# Patient Record
Sex: MALE | Race: WHITE | NOT HISPANIC OR LATINO | Employment: OTHER | URBAN - METROPOLITAN AREA
[De-identification: names, ages, dates, MRNs, and addresses within clinical notes are randomized per-mention and may not be internally consistent; named-entity substitution may affect disease eponyms.]

---

## 2017-02-20 PROBLEM — S04.60XA: Status: ACTIVE | Noted: 2017-02-20

## 2017-03-08 ENCOUNTER — CLINICAL SUPPORT (OUTPATIENT)
Dept: AUDIOLOGY | Facility: CLINIC | Age: 47
End: 2017-03-08
Payer: COMMERCIAL

## 2017-03-08 ENCOUNTER — INITIAL CONSULT (OUTPATIENT)
Dept: OTOLARYNGOLOGY | Facility: CLINIC | Age: 47
End: 2017-03-08
Payer: COMMERCIAL

## 2017-03-08 VITALS
BODY MASS INDEX: 31.98 KG/M2 | WEIGHT: 211 LBS | SYSTOLIC BLOOD PRESSURE: 132 MMHG | HEART RATE: 87 BPM | TEMPERATURE: 99 F | DIASTOLIC BLOOD PRESSURE: 92 MMHG | HEIGHT: 68 IN

## 2017-03-08 DIAGNOSIS — Z86.69 HISTORY OF MIGRAINE: ICD-10-CM

## 2017-03-08 DIAGNOSIS — H81.313 VERTIGO, AURAL, BILATERAL: Primary | ICD-10-CM

## 2017-03-08 DIAGNOSIS — Z77.122 HISTORY OF EXPOSURE TO NOISE: ICD-10-CM

## 2017-03-08 DIAGNOSIS — H93.13 TINNITUS, BILATERAL: ICD-10-CM

## 2017-03-08 DIAGNOSIS — H90.3 HEARING LOSS, SENSORINEURAL, HIGH FREQUENCY, BILATERAL: Primary | ICD-10-CM

## 2017-03-08 DIAGNOSIS — Z87.898 HISTORY OF VERTIGO: ICD-10-CM

## 2017-03-08 DIAGNOSIS — Z82.0 FAMILY HISTORY OF MIGRAINE: ICD-10-CM

## 2017-03-08 PROCEDURE — 99999 PR PBB SHADOW E&M-EST. PATIENT-LVL III: CPT | Mod: PBBFAC,,, | Performed by: OTOLARYNGOLOGY

## 2017-03-08 PROCEDURE — 3080F DIAST BP >= 90 MM HG: CPT | Mod: S$GLB,,, | Performed by: OTOLARYNGOLOGY

## 2017-03-08 PROCEDURE — 3075F SYST BP GE 130 - 139MM HG: CPT | Mod: S$GLB,,, | Performed by: OTOLARYNGOLOGY

## 2017-03-08 PROCEDURE — 92550 TYMPANOMETRY & REFLEX THRESH: CPT | Mod: S$GLB,,, | Performed by: AUDIOLOGIST

## 2017-03-08 PROCEDURE — 99203 OFFICE O/P NEW LOW 30 MIN: CPT | Mod: S$GLB,,, | Performed by: OTOLARYNGOLOGY

## 2017-03-08 PROCEDURE — 1160F RVW MEDS BY RX/DR IN RCRD: CPT | Mod: S$GLB,,, | Performed by: OTOLARYNGOLOGY

## 2017-03-08 PROCEDURE — 92557 COMPREHENSIVE HEARING TEST: CPT | Mod: S$GLB,,, | Performed by: AUDIOLOGIST

## 2017-03-08 NOTE — MR AVS SNAPSHOT
"    [facility] - Otorhinolaryngology  [address]  [facility] [address]  Phone: [phone]  Fax: [phone]                  Jeff Bullock   3/8/2017 9:00 AM   Initial consult    Description:  Male : 1970   Provider:  Chriss Cho III, MD   Department:  [facility] - Otorhinolaryngology           Diagnoses this Visit        Comments    Hearing loss, sensorineural, high frequency, bilateral    -  Primary     History of migraine         History of vertigo         History of exposure to noise         Family history of migraine         Tinnitus, bilateral                To Do List           Goals (5 Years of Data)     None      [facility]     [facility] Care [facility] -  Assistance  Registered nurses in the [facility] Center provide clinical advisement, health education, appointment booking, and other advisory services.  Call for this free service at [phone].             Medications           Message regarding Medications     Verify the changes and/or additions to your medication regime listed below are the same as discussed with your clinician today.  If any of these changes or additions are incorrect, please notify your healthcare provider.             Verify that the below list of medications is an accurate representation of the medications you are currently taking.  If none reported, the list may be blank. If incorrect, please contact your healthcare provider. Carry this list with you in case of emergency.           Current Medications     amlodipine (NORVASC) 5 MG tablet Take 1 tablet (5 mg total) by mouth once daily.           Clinical Reference Information           Your Vitals Were     BP Pulse Temp    132/92 (BP Location: Right arm, Patient Position: Sitting, BP Method: Automatic) 87 98.6 °F (37 °C) (Tympanic)    Height Weight BMI    5' 8" (1.727 m) 95.7 kg (210 lb 15.7 oz) 32.08 kg/m2      Blood Pressure          Most Recent Value    BP  (!)  132/92    "   Allergies as of 3/8/2017     Demerol [Meperidine]      Immunizations Administered on Date of Encounter - 3/8/2017     None      [facility] Sign-Up     Activating your [facility] account is as easy as 1-2-3!     1) Visit [email or web address], select Sign Up Now, enter this activation code and your date of birth, then select Next.  [email or web address]  Expires: 4/6/2017  2:06 PM      2) Create a username and password to use when you visit [facility] in the future and select a security question in case you lose your password and select Next.    3) Enter your e-mail address and click Sign Up!    Additional Information  If you have questions, please e-mail [email or web address] or call [phone] to talk to our [facility] staff. Remember, [facility] is NOT to be used for urgent needs. For medical emergencies, dial 911.         Instructions    Audiometry reviewed: high frequency SNHL  Hearing protection  Migraine diet sheet provided  Consider neurology consultation pending course [id number]  Complete VNG testing to be scheduled; vertigo work-up literature provided  Old records may be helpful( Navneet)                  Language Assistance Services     ATTENTION: Language assistance services are available, free of charge. Please call [phone].      ATENCIÓN: Si habla español, tiene a gustafson disposición servicios gratuitos de asistencia lingüística. Llame al [phone].     CHÚ Ý: N?u b?n nói Ti?ng Vi?t, có các d?ch v? h? tr? ngôn ng? mi?n phí dành cho b?n. G?i s? [phone].         [facility] complies with applicable Federal civil rights laws and does not discriminate on the basis of race, color, national origin, age, disability, or sex.

## 2017-03-08 NOTE — PROGRESS NOTES
Normal sloping to moderate rising to normal sensorineural hearing loss with type A tympanograms and present reflexes bilaterally.

## 2017-03-08 NOTE — PROGRESS NOTES
Subjective:       Patient ID: Jeff Bullock is a 46 y.o. male.    Chief Complaint: No chief complaint on file.    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    He remembers vestibular rehabilitation therapy scheduled and performed after his workup.He was diagnosed with some form of left ear pathology.  I believe the patient would like some clarification about his in her ear dizziness and vertigo symptoms status.  He is presently at .  He works locally and regionally.  He is not plagued by dizziness or vertigo symptoms while driving at this point.  He indicates dizziness symptoms of 1 year duration.    He is status post a MRI brain scan performed in April 2016 which was an open study without dye performed in [place].  He has a history of migraine headaches which exacerbated by stress.  He had problems in [place] when he had to leave a good job there due to a corporate shake up.  He has a large family.  His life is much more stable now.  He was examined by Dr. Jeffries 2/20/17 with dizziness symptoms, noting his history of vertigo.    His medical problem list includes a pack disorder, mood disorder, migraine, GERD, essential hypertension, chronic tension-type headaches and vestibular nerve damage?    His medication list had included Xanax and Prozac and 25 Antivert.    PMH: High blood pressure, migraine headaches, vertigo  Family history: Migraine headaches, heart disease, high blood pressure  ALLERGIES: Demerol  Review of Systems   Ears: Positive for ear pressure, ringing in ear and dizziness.    Nose:  Positive for postnasal drip and snoring.    Cardiovascular:  Positive for history of high blood pressure.   Gastrointestinal:  Positive for acid reflux.   Other:  Positive for depression and anxiety. Negative for rash.         The patient has completed an audiometric study performed  by the [facility] audiology service.  The study is duplicated below the results reviewed with the patient  Objective:           Blood pressure 132/92 pulse 87 temperature 98.6 height 5 feet 8 inches weight 210 pounds.  Gen.: Alert and oriented and articulate gentleman in no acute distress today  Physical Exam   Constitutional: He is oriented to person, place, and time. He appears well-developed and well-nourished.   HENT:   Head: Normocephalic.   Right Ear: Hearing, tympanic membrane and ear canal normal. No drainage. No foreign bodies. No mastoid tenderness. Tympanic membrane is not perforated. No decreased hearing is noted.   Left Ear: Hearing, tympanic membrane and ear canal normal. No drainage. No foreign bodies. No mastoid tenderness. Tympanic membrane is not perforated. No decreased hearing is noted.   Ears:    Nose: No nose lacerations, nasal deformity, septal deviation or nasal septal hematoma. No epistaxis. Right sinus exhibits no maxillary sinus tenderness and no frontal sinus tenderness. Left sinus exhibits no maxillary sinus tenderness and no frontal sinus tenderness.   Mouth/Throat: Uvula is midline, oropharynx is clear and moist and mucous membranes are normal. He does not have dentures. No oral lesions. No trismus in the jaw. No uvula swelling or dental caries. No oropharyngeal exudate or tonsillar abscesses.   Neck: No thyromegaly present.   Pulmonary/Chest: Effort normal. No stridor.   Lymphadenopathy:     He has no cervical adenopathy.   Neurological: He is alert and oriented to person, place, and time.   Skin: No rash noted.   Psychiatric: His behavior is normal.       Assessment:       1. Hearing loss, sensorineural, high frequency, bilateral    2. History of migraine    3. History of vertigo    4. History of exposure to noise    5. Family history of migraine    6. Tinnitus, bilateral        Plan:     Audiometry reviewed: high frequency SNHL  Hearing protection  Migraine diet sheet  provided  Consider neurology consultation pending course [id number]  Complete VNG testing to be scheduled; vertigo work-up literature provided  Old records may be helpful( Navneet)

## 2017-03-08 NOTE — PATIENT INSTRUCTIONS
Audiometry reviewed: high frequency SNHL  Hearing protection  Migraine diet sheet provided  Consider neurology consultation pending course [id number]  Complete VNG testing to be scheduled; vertigo work-up literature provided  Old records may be helpful( Navneet)

## 2017-03-30 ENCOUNTER — OFFICE VISIT (OUTPATIENT)
Dept: OTOLARYNGOLOGY | Facility: CLINIC | Age: 47
End: 2017-03-30
Payer: COMMERCIAL

## 2017-03-30 ENCOUNTER — CLINICAL SUPPORT (OUTPATIENT)
Dept: AUDIOLOGY | Facility: CLINIC | Age: 47
End: 2017-03-30
Payer: COMMERCIAL

## 2017-03-30 VITALS — HEART RATE: 83 BPM | TEMPERATURE: 98 F | DIASTOLIC BLOOD PRESSURE: 86 MMHG | SYSTOLIC BLOOD PRESSURE: 129 MMHG

## 2017-03-30 DIAGNOSIS — Z86.69 HISTORY OF MIGRAINE: ICD-10-CM

## 2017-03-30 DIAGNOSIS — R42 LIGHTHEADEDNESS: Primary | ICD-10-CM

## 2017-03-30 DIAGNOSIS — H90.3 HEARING LOSS, SENSORINEURAL, HIGH FREQUENCY, BILATERAL: ICD-10-CM

## 2017-03-30 DIAGNOSIS — R42 DIZZINESS: Primary | ICD-10-CM

## 2017-03-30 DIAGNOSIS — F41.0 PANIC DISORDER: ICD-10-CM

## 2017-03-30 PROCEDURE — 92537 CALORIC VSTBLR TEST W/REC: CPT | Mod: S$GLB,,, | Performed by: AUDIOLOGIST-HEARING AID FITTER

## 2017-03-30 PROCEDURE — 99999 PR PBB SHADOW E&M-EST. PATIENT-LVL III: CPT | Mod: PBBFAC,,, | Performed by: OTOLARYNGOLOGY

## 2017-03-30 PROCEDURE — 92540 BASIC VESTIBULAR EVALUATION: CPT | Mod: S$GLB,,, | Performed by: AUDIOLOGIST-HEARING AID FITTER

## 2017-03-30 PROCEDURE — 99212 OFFICE O/P EST SF 10 MIN: CPT | Mod: S$GLB,,, | Performed by: OTOLARYNGOLOGY

## 2017-03-30 PROCEDURE — 1160F RVW MEDS BY RX/DR IN RCRD: CPT | Mod: S$GLB,,, | Performed by: OTOLARYNGOLOGY

## 2017-03-30 PROCEDURE — 3074F SYST BP LT 130 MM HG: CPT | Mod: S$GLB,,, | Performed by: OTOLARYNGOLOGY

## 2017-03-30 PROCEDURE — 3079F DIAST BP 80-89 MM HG: CPT | Mod: S$GLB,,, | Performed by: OTOLARYNGOLOGY

## 2017-03-30 RX ORDER — ALPRAZOLAM 0.5 MG/1
TABLET ORAL
Refills: 3 | COMMUNITY
Start: 2017-03-09 | End: 2017-04-18 | Stop reason: SDUPTHER

## 2017-03-30 NOTE — PROGRESS NOTES
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  He has a history of migraine.  His chief complaint is a lightheadedness sensation or a "hangover" feeling.  He is a .  He indicates dizziness symptoms which can escalate and cause panic attacks at times.    He admits that he is  getting better at controlling them in the wake of his many medical visits here however.    He is more comfortable in general with his workup and diagnoses and prognosis now.    V NG test results are reviewed with him:  There was no evidence of gaze nystagmus with spontaneous nystagmus recorded during the study today.  Ocular motor function was normal as was fixation suppression.  Both head hanging left and head hanging right Hallpike maneuvers were negative.  Static positional testing revealed a clinically insignificant nystagmus in 1 head position i.e. 2°/s left beating in head left position.  Caloric testing indicated a 19% right caloric weakness with all cords measuring within 1° of each other with exception of the left warm caloric.    Impression: Normal VNG; no evidence of vestibular system dysfunction including BPPV.      PE:/86 P 83 T 97.8  Otologic exam unchanged from previous status.      DIAGNOSIS:     ICD-10-CM ICD-9-CM    1. Lightheadedness R42 780.4    2. History of migraine Z86.69 V12.49    3. Panic disorder F41.0 300.01    4. Hearing loss, sensorineural, high frequency, bilateral H91.93 389.8      PLAN: VNG results reviewed with patient  Cawthorne head exercises may help; perform BID x several weeks; instruction sheet provided  Call with status report 2-3 weeks  Consider neurological consulattion pending course re: migraine/panic attack management  Hearing protection reiterated prn      "

## 2017-03-30 NOTE — PATIENT INSTRUCTIONS
VNG results reviewed with patient  Cawthorne head exercises may help; perform BID x several weeks  Call with status report 2-3 weeks  Consider neurological consulattion pending course re: migraine/panic attack management  Hearing protection reiterated prn

## 2017-03-30 NOTE — PROGRESS NOTES
VNG/Postuography Evaluation    Referring physician:  Dr. Cho    46 y.o. male complains of lightheadedness, nausea and occasional headache.  Symptoms are provoked by quick head turns, bending over and looking up and have been recurring over the past year. Mr. Bullock reported that his episodes are pretty brief-usually lasting only a few minutes. He stated his symptoms have gotten better over the past couple of weeks. Mr. Bullock did admit that he had increase stress and several migraines about a year ago when all of this started to occur.    Gaze nystagmus was absent.    Oculomotor function was normal.    Spontaneous nystagmus was absent.    The head-hanging left Hallpike was negative.    The head-hanging right Hallpike was negative.    Static positional testing revealed clinically insignificant nystagmus in one head position: 2 d/s left-beating in head left.    Unilateral weakness: 19% (right)  Directional preponderance 16% (left beating)    RC: 12 d/s   d/s  RW: 11 d/s  LW: 23 d/s    Fixation suppression was normal.    Impression: Normal VNG; no evidence of vestibular system dysfunction including BPPV. The presence of clinically insignificant positional nystagmus suggests a non-localizing vestibular abnormality that is incompletely compensated.    Recommendation: Trial period with Cawthorne exercises. Mr. Bullock was given these a copy to try at home.

## 2017-06-01 ENCOUNTER — OFFICE VISIT (OUTPATIENT)
Dept: NEUROLOGY | Facility: CLINIC | Age: 47
End: 2017-06-01
Payer: COMMERCIAL

## 2017-06-01 VITALS
HEART RATE: 89 BPM | SYSTOLIC BLOOD PRESSURE: 143 MMHG | BODY MASS INDEX: 32.68 KG/M2 | HEIGHT: 68 IN | DIASTOLIC BLOOD PRESSURE: 91 MMHG | WEIGHT: 215.63 LBS

## 2017-06-01 DIAGNOSIS — G43.909 MIGRAINE WITHOUT STATUS MIGRAINOSUS, NOT INTRACTABLE, UNSPECIFIED MIGRAINE TYPE: ICD-10-CM

## 2017-06-01 DIAGNOSIS — R42 DIZZINESS: Primary | ICD-10-CM

## 2017-06-01 DIAGNOSIS — M54.2 CERVICALGIA: ICD-10-CM

## 2017-06-01 DIAGNOSIS — Z72.0 TOBACCO USE: ICD-10-CM

## 2017-06-01 DIAGNOSIS — I10 ESSENTIAL HYPERTENSION: ICD-10-CM

## 2017-06-01 PROCEDURE — 99999 PR PBB SHADOW E&M-EST. PATIENT-LVL III: CPT | Mod: PBBFAC,,,

## 2017-06-01 PROCEDURE — 99205 OFFICE O/P NEW HI 60 MIN: CPT | Mod: S$GLB,,,

## 2017-06-01 RX ORDER — OMEPRAZOLE 20 MG/1
20 TABLET, DELAYED RELEASE ORAL DAILY
COMMUNITY
End: 2021-09-16 | Stop reason: ALTCHOICE

## 2017-06-01 RX ORDER — PROPRANOLOL HYDROCHLORIDE 60 MG/1
60 CAPSULE, EXTENDED RELEASE ORAL DAILY
Qty: 30 CAPSULE | Refills: 1 | Status: SHIPPED | OUTPATIENT
Start: 2017-06-01 | End: 2017-06-05 | Stop reason: SINTOL

## 2017-06-01 NOTE — LETTER
June 1, 2017      Mich Jeffries MD  [address] [address] [address]           [facility] Neurology  [address]  [place] [address]  Phone: [phone]  Fax: [phone]          Patient: Jeff Bullock   MR Number: [id number]   YOB: 1970   Date of Visit: 6/1/2017       Dear Dr. Mich Jeffries:    Thank you for referring Jeff Bullock to me for evaluation. Attached you will find relevant portions of my assessment and plan of care.    If you have questions, please do not hesitate to call me. I look forward to following Jeff Bullock along with you.    Sincerely,    Sb Gomez III, MD    Enclosure  CC:  No Recipients    If you would like to receive this communication electronically, please contact [email or web address] or [phone] to request more information on [employer] Link access.    For providers and/or their staff who would like to refer a patient to [facility], please contact us through our one-stop-shop provider referral line, [facility], at [phone].    If you feel you have received this communication in error or would no longer like to receive these types of communications, please e-mail [email or web address]

## 2017-06-01 NOTE — PATIENT INSTRUCTIONS
I discussed the risks, benefits and side effects of medication.  If any problem with the medication instructed to discontinue it and notify me immediately.  I will forward your results with any additional recommendations.   Follow up with your PCP.

## 2017-06-01 NOTE — PROGRESS NOTES
This office note has been dictated.  June 1, 2017      Mich Jeffries M.D.  [address]  [address]    RE:  JEFF HARRIS  [facility]  [id number]    Dear Dr. Jeffries:    I saw your patient, Jeff Harris in my office on June 1, 2017.  This 46-year-old   right-handed man presents to Neurology Clinic with a chief complaint of   dizziness.  This has been present for more than a year now.  He cannot relate   the onset to any particular illness or injury.  He describes a foggy sensation   in his head associated with ear fullness.  He also notes neck stiffness or pain   and has a history of ocular migraines as well.  This has been evaluated with   brain imaging and more recently an ENT evaluation with Dr. Cho.  He was   found to have high frequency hearing loss bilaterally, but vestibular   nystagmography was negative.  When he is stressed out, his neck discomfort   increases, he feels fatigued, and this seems to make the dizziness worse.  He   gets a little bit of nausea, but there is no vomiting.  Extending his neck as if   looking up also makes his symptomatology worse.  He notes some sensitivity   to bright lights.  He has been tried on several medications in the past for   panic episodes and depression including Klonopin, Effexor and Lexapro, which   have been either poorly tolerated or ineffective.    NEUROLOGICAL REVIEW OF SYSTEMS:  He has noted occasional sweating.  The   migraines are predominantly ocular with only a very mild headache component and   these actually are occurring fairly infrequently.  He does admit to being an   anxious individual.  He also takes Xanax to sleep at night.  He denies fever,   chills, chest pain, shortness of breath, syncope, back or leg pain,   paresthesias, weakness in the extremities, ataxia, tremor, diplopia, visual   loss, speech or swallowing difficulty, bowel or bladder incontinence.    PAST MEDICAL HISTORY:  Reviewed with the patient, edited by me  in the electronic   record and is included in this note.    PHYSICAL EXAMINATION:  GENERAL:  This is an alert, oriented and attentive man.  His speech is   appropriate and adequately articulated.  VITAL SIGNS:  Reviewed.  The blood pressure is mildly elevated and the values   are included in this note.  I am recommending he follow this up with you.  He is dressed in his work attire and in no acute distress.  HEENT:  Cranial nerve examination reveals his acuity to be 20/20 to the near   card with correction.  Visual fields are full.  Pupils are equal and reactive to   light and the extraocular movements are conjugate.  The optic disks are sharp   bilaterally.  There are no sensorimotor deficits on the face and his tongue and   palate are in the midline.  He hears equally in the two ears to the tuning fork.  NECK:  Supple, the pulses equal and no bruits are heard.  NEUROLOGIC:  On motor examination, he has excellent symmetrical strength with   normal muscle bulk and tone in all extremities.    On sensory examination, all modalities are intact and he has palpable peripheral pulses.    On cerebellar testing, finger-to-nose, fine motor and rapid alternating movements are   adequately and equally performed.  He can rise from the chair without pushing up   with his hands and walks well on a narrow base toes and heels.    Romberg sign is not present.    The deep tendon reflexes are normoactive and symmetrical and his   plantar responses are flexor bilaterally.    I reviewed his clinic record including his recent laboratory on the computer   console.  I discussed the situation with him in detail.    In conclusion, this is a man with a complaint of dizziness who has documented   high frequency hearing loss, but an otherwise benign neurological examination.    He has associated symptomatology including ocular migraine as well as some neck   discomfort and exacerbation of his dizziness with hyperextension.  I am going to    request a CT angiography of the head and neck to assess for degenerative   arthritis, which might be causing vascular compromise in the posterior   circulation.  I am going to also prescribe Inderal-LA 60 one a day as a migraine   preventative, which should also help with some of his stress and anxiety, as   well as his blood pressure.  I am also recommending he start an 81 mg aspirin   tablet a day for vascular prophylaxis and a daily multivitamin.  I discussed   potential risks and benefits including side effects of the medication and asked   that he notify me immediately and discontinue it if he has any problem taking   it.  I will be forwarding the results of the CTA to him with any additional   Recommendations as indicated, and plan to see him back in clinic in two   months to review the situation with him again.  I am otherwise returning him to   your care for followup.    In the meantime, it has been a pleasure to see this man in consultation.  If I   could be of help to you in the future, please let me know.    Sincerely,      Sb Gomez III, M.D.                  BOB  dd: 06/01/2017 15:20:35 (CDT)  td: 06/01/2017 15:51:25 (CDT)  Doc ID   #[id number]  Job ID #[id number]    CC:

## 2017-06-05 ENCOUNTER — TELEPHONE (OUTPATIENT)
Dept: NEUROLOGY | Facility: CLINIC | Age: 47
End: 2017-06-05

## 2017-06-05 DIAGNOSIS — R42 DIZZINESS: Primary | ICD-10-CM

## 2017-06-05 DIAGNOSIS — M54.2 CERVICALGIA: ICD-10-CM

## 2017-06-05 DIAGNOSIS — G43.909 MIGRAINE WITHOUT STATUS MIGRAINOSUS, NOT INTRACTABLE, UNSPECIFIED MIGRAINE TYPE: ICD-10-CM

## 2017-06-05 DIAGNOSIS — I10 ESSENTIAL HYPERTENSION: ICD-10-CM

## 2017-06-05 DIAGNOSIS — Z72.0 TOBACCO USE: ICD-10-CM

## 2017-06-05 RX ORDER — PROPRANOLOL HYDROCHLORIDE 10 MG/1
10 TABLET ORAL EVERY 6 HOURS PRN
Qty: 30 TABLET | Refills: 2 | Status: SHIPPED | OUTPATIENT
Start: 2017-06-05 | End: 2017-08-08

## 2017-06-05 NOTE — TELEPHONE ENCOUNTER
Discussed with the patient.  Has felt dizzy and nauseous, trouble sleeping with Inderal LA 60.  Will d/c and try 10 mg Inderal Q 6-8 hrs prn.  [facility]

## 2017-06-08 ENCOUNTER — HOSPITAL ENCOUNTER (OUTPATIENT)
Dept: RADIOLOGY | Facility: HOSPITAL | Age: 47
Discharge: HOME | End: 2017-06-08
Payer: COMMERCIAL

## 2017-06-08 DIAGNOSIS — G43.909 MIGRAINE WITHOUT STATUS MIGRAINOSUS, NOT INTRACTABLE, UNSPECIFIED MIGRAINE TYPE: ICD-10-CM

## 2017-06-08 DIAGNOSIS — M54.2 CERVICALGIA: ICD-10-CM

## 2017-06-08 DIAGNOSIS — R42 DIZZINESS: ICD-10-CM

## 2017-06-08 DIAGNOSIS — I10 ESSENTIAL HYPERTENSION: ICD-10-CM

## 2017-06-08 DIAGNOSIS — Z72.0 TOBACCO USE: ICD-10-CM

## 2017-06-08 PROCEDURE — 70498 CT ANGIOGRAPHY NECK: CPT | Mod: 26,,, | Performed by: RADIOLOGY

## 2017-06-08 PROCEDURE — 70496 CT ANGIOGRAPHY HEAD: CPT | Mod: TC

## 2017-06-08 PROCEDURE — 70496 CT ANGIOGRAPHY HEAD: CPT | Mod: 26,,, | Performed by: RADIOLOGY

## 2017-06-08 PROCEDURE — 25500020 PHARM REV CODE 255

## 2017-06-08 RX ADMIN — IOHEXOL 100 ML: 350 INJECTION, SOLUTION INTRAVENOUS at 04:06

## 2017-06-09 ENCOUNTER — TELEPHONE (OUTPATIENT)
Dept: NEUROLOGY | Facility: CLINIC | Age: 47
End: 2017-06-09

## 2017-06-09 LAB
CREAT SERPL-MCNC: 1 MG/DL (ref 0.5–1.4)
SAMPLE: NORMAL

## 2017-06-09 NOTE — TELEPHONE ENCOUNTER
----- Message from Marija Marshall sent at 6/9/2017 12:06 PM CDT -----  Contact: pt [phone]  Pt is calling for his test results.pls call

## 2017-08-01 ENCOUNTER — TELEPHONE (OUTPATIENT)
Dept: NEUROLOGY | Facility: CLINIC | Age: 47
End: 2017-08-01

## 2017-08-01 NOTE — TELEPHONE ENCOUNTER
Patient mobile number not working and home number  was unable to leave a message due to mailbox being full. I will mail an appointment slip to the patient home.

## 2017-08-08 PROBLEM — S04.60XA: Status: RESOLVED | Noted: 2017-02-20 | Resolved: 2017-08-08

## 2017-08-08 PROBLEM — R42 DIZZINESS: Status: RESOLVED | Noted: 2017-06-01 | Resolved: 2017-08-08

## 2019-06-18 ENCOUNTER — OFFICE VISIT (OUTPATIENT)
Dept: GASTROENTEROLOGY | Facility: CLINIC | Age: 49
End: 2019-06-18
Payer: COMMERCIAL

## 2019-06-18 VITALS
BODY MASS INDEX: 30.1 KG/M2 | SYSTOLIC BLOOD PRESSURE: 127 MMHG | WEIGHT: 198 LBS | HEART RATE: 80 BPM | DIASTOLIC BLOOD PRESSURE: 80 MMHG

## 2019-06-18 DIAGNOSIS — R09.A2 GLOBUS SENSATION: ICD-10-CM

## 2019-06-18 DIAGNOSIS — F41.9 ANXIETY: ICD-10-CM

## 2019-06-18 DIAGNOSIS — R13.10 DYSPHAGIA, UNSPECIFIED TYPE: ICD-10-CM

## 2019-06-18 DIAGNOSIS — K21.9 GASTROESOPHAGEAL REFLUX DISEASE, ESOPHAGITIS PRESENCE NOT SPECIFIED: Primary | ICD-10-CM

## 2019-06-18 PROCEDURE — 99244 OFF/OP CNSLTJ NEW/EST MOD 40: CPT | Mod: S$GLB,,, | Performed by: INTERNAL MEDICINE

## 2019-06-18 PROCEDURE — 99999 PR PBB SHADOW E&M-EST. PATIENT-LVL III: CPT | Mod: PBBFAC,,, | Performed by: INTERNAL MEDICINE

## 2019-06-18 PROCEDURE — 99999 PR PBB SHADOW E&M-EST. PATIENT-LVL III: ICD-10-PCS | Mod: PBBFAC,,, | Performed by: INTERNAL MEDICINE

## 2019-06-18 PROCEDURE — 99244 PR OFFICE CONSULTATION,LEVEL IV: ICD-10-PCS | Mod: S$GLB,,, | Performed by: INTERNAL MEDICINE

## 2019-06-18 NOTE — LETTER
June 18, 2019      Mich Jeffries MD  [address] [address] [address]           [facility] Gastroenterology  [address]  [facility] [address]  Phone: [phone]          Patient: Jeff Bullock   MR Number: [id number]   YOB: 1970   Date of Visit: 6/18/2019       Dear Dr. Mich Jeffries:    Thank you for referring Jeff Bullock to me for evaluation. Attached you will find relevant portions of my assessment and plan of care.    If you have questions, please do not hesitate to call me. I look forward to following Jeff Bullock along with you.    Sincerely,    Pradeep Wheatley MD    Enclosure  CC:  No Recipients    If you would like to receive this communication electronically, please contact [email or web address] or [phone] to request more information on [employer] Link access.    For providers and/or their staff who would like to refer a patient to [facility], please contact us through our one-stop-shop provider referral line, [facility], at [phone].    If you feel you have received this communication in error or would no longer like to receive these types of communications, please e-mail [email or web address]

## 2019-06-18 NOTE — H&P (VIEW-ONLY)
Subjective:       Patient ID: Jeff Bullock is a 48 y.o. male.    This patient is new to me.  Referring provider:  Dr. Jeffries for GERD.      Chief Complaint: GERD    Gastroesophageal Reflux   He complains of abdominal pain (epigastric), dysphagia (intermittent), globus sensation, heartburn and water brash. He reports no chest pain, no coughing, no nausea or no wheezing. This is a new problem. The current episode started more than 1 year ago. The problem occurs frequently. The problem has been gradually worsening. The heartburn duration is several minutes. The heartburn is of moderate intensity. The heartburn wakes him from sleep. The heartburn does not limit his activity. The heartburn changes with position. The symptoms are aggravated by certain foods. Pertinent negatives include no anemia, fatigue, melena or weight loss. Risk factors include caffeine use. He has tried a PPI for the symptoms. The treatment provided mild relief. Past procedures include an EGD (Last one was many years ago with Dr. Car). He has never had screening colonoscopy.     Review of Systems   Constitutional: Negative for chills, fatigue, fever and weight loss.   HENT: Negative for trouble swallowing.    Respiratory: Negative for cough, shortness of breath and wheezing.    Cardiovascular: Negative for chest pain and palpitations.   Gastrointestinal: Positive for abdominal pain (epigastric), dysphagia (intermittent) and heartburn. Negative for constipation, diarrhea, melena, nausea and vomiting.   Musculoskeletal: Negative for arthralgias and myalgias.   Skin: Negative for color change and rash.   Neurological: Negative for dizziness, weakness and numbness.   Psychiatric/Behavioral: Negative for confusion. The patient is not nervous/anxious.    All other systems reviewed and are negative.      Objective:       Vitals:    06/18/19 1441   BP: 127/80   Pulse: 80   Weight: 89.8 kg (197 lb 15.6 oz)       Physical Exam   Constitutional: He is  oriented to person, place, and time. He appears well-developed and well-nourished.   HENT:   Head: Normocephalic and atraumatic.   Mouth/Throat: Oropharynx is clear and moist.   Eyes: Pupils are equal, round, and reactive to light. Conjunctivae are normal. No scleral icterus.   Neck: Normal range of motion. Neck supple. No thyromegaly present.   Cardiovascular: Normal rate and regular rhythm.   No murmur heard.  Pulmonary/Chest: Effort normal and breath sounds normal. He has no wheezes.   Abdominal: Soft. Bowel sounds are normal. He exhibits no distension. There is no tenderness.   Musculoskeletal: He exhibits no edema.   Lymphadenopathy:     He has no cervical adenopathy.   Neurological: He is alert and oriented to person, place, and time.   Skin: Skin is warm and dry. No rash noted. No erythema.   Psychiatric: He has a normal mood and affect. His behavior is normal.   Vitals reviewed.        Lab Results   Component Value Date    WBC 5.90 05/28/2019    HGB 17.1 05/28/2019    HCT 49.4 05/28/2019    MCV 95 05/28/2019     05/28/2019       CMP  Sodium   Date Value Ref Range Status   05/28/2019 143 136 - 145 mmol/L Final     Potassium   Date Value Ref Range Status   05/28/2019 3.8 3.5 - 5.1 mmol/L Final     Chloride   Date Value Ref Range Status   05/28/2019 102 95 - 110 mmol/L Final     CO2   Date Value Ref Range Status   05/28/2019 31 22 - 31 mmol/L Final     Glucose   Date Value Ref Range Status   05/28/2019 130 (H) 70 - 110 mg/dL Final     Comment:     The ADA recommends the following guidelines for fasting glucose:  Normal:       less than 100 mg/dL  Prediabetes:  100 mg/dL to 125 mg/dL  Diabetes:     126 mg/dL or higher       BUN, Bld   Date Value Ref Range Status   05/28/2019 17 9 - 21 mg/dL Final     Creatinine   Date Value Ref Range Status   05/28/2019 0.90 0.50 - 1.40 mg/dL Final     Calcium   Date Value Ref Range Status   05/28/2019 9.3 8.4 - 10.2 mg/dL Final     Total Protein   Date Value Ref Range  Status   05/28/2019 8.2 6.0 - 8.4 g/dL Final     Albumin   Date Value Ref Range Status   05/28/2019 4.9 3.5 - 5.2 g/dL Final     Total Bilirubin   Date Value Ref Range Status   05/28/2019 0.4 0.2 - 1.3 mg/dL Final     Alkaline Phosphatase   Date Value Ref Range Status   05/28/2019 62 38 - 145 U/L Final     AST   Date Value Ref Range Status   05/28/2019 33 17 - 59 U/L Final     ALT   Date Value Ref Range Status   05/28/2019 33 10 - 44 U/L Final     Anion Gap   Date Value Ref Range Status   05/28/2019 10 8 - 16 mmol/L Final     eGFR if    Date Value Ref Range Status   05/28/2019 >60 >60 mL/min/1.73 m^2 Final     eGFR if non    Date Value Ref Range Status   05/28/2019 >60 >60 mL/min/1.73 m^2 Final     Comment:     Calculation used to obtain the estimated glomerular filtration  rate (eGFR) is the CKD-EPI equation.          CXR was independently visualized and reviewed by me and showed no acute process.    Further history was obtained from the patient's wife who was present throughout the interview and states that he has had longstanding GERD.  History is otherwise as above in the HPI.     Assessment:       1. Gastroesophageal reflux disease, esophagitis presence not specified    2. Dysphagia, unspecified type    3. Globus sensation    4. Anxiety        Plan:       1.  Antireflux precautions including avoidance of late night eating and lying down soon after eating.     2.  Continue PPI  3.  Proven risks of long term PPI use, including pneumonia, c.diff infection, and bone loss, as well as theoretical risks including dementia and CKD, were discussed with the patient at length and the patient understands risks and benefits of therapy.  Option of tapering/weaning PPI away was also discussed, including the need for possible long term therapy to treat symptoms if they recur after cessation of medication, as well as to mitigate the risk of developing complications of reflux such as Guido's  esophagus and/or esophageal cancer.  Patient understands the risks and benefits of treatment with drug versus cessation.  Daily supplementation with MVI with calcium and vitamin D were recommended as was follow up with PCP for bone scan when appropriate.  Labs including B12, folate, CMP, CBC, calcium, and magnesium should be checked at least annually.  4.  Schedule EGD  5.  Colonoscopy for screening  6.  Avoid NSAIDs  7.  Further recommendations to follow after above.  8.  Communication will be sent to the referring provider regarding my assessment and plan on this patient via EPIC.

## 2019-06-24 ENCOUNTER — ANESTHESIA (OUTPATIENT)
Dept: ENDOSCOPY | Facility: HOSPITAL | Age: 49
End: 2019-06-24
Payer: COMMERCIAL

## 2019-06-24 ENCOUNTER — ANESTHESIA EVENT (OUTPATIENT)
Dept: ENDOSCOPY | Facility: HOSPITAL | Age: 49
End: 2019-06-24
Payer: COMMERCIAL

## 2019-06-24 ENCOUNTER — HOSPITAL ENCOUNTER (OUTPATIENT)
Facility: HOSPITAL | Age: 49
Discharge: HOME | End: 2019-06-24
Attending: INTERNAL MEDICINE | Admitting: INTERNAL MEDICINE
Payer: COMMERCIAL

## 2019-06-24 DIAGNOSIS — K44.9 HIATAL HERNIA: ICD-10-CM

## 2019-06-24 DIAGNOSIS — K29.70 GASTRITIS, PRESENCE OF BLEEDING UNSPECIFIED, UNSPECIFIED CHRONICITY, UNSPECIFIED GASTRITIS TYPE: Primary | ICD-10-CM

## 2019-06-24 DIAGNOSIS — K31.7 GASTRIC POLYPS: ICD-10-CM

## 2019-06-24 DIAGNOSIS — K21.9 GASTROESOPHAGEAL REFLUX: ICD-10-CM

## 2019-06-24 PROCEDURE — 25000003 PHARM REV CODE 250: Performed by: INTERNAL MEDICINE

## 2019-06-24 PROCEDURE — 63600175 PHARM REV CODE 636 W HCPCS: Performed by: NURSE ANESTHETIST, CERTIFIED REGISTERED

## 2019-06-24 PROCEDURE — 43239 EGD BIOPSY SINGLE/MULTIPLE: CPT | Performed by: INTERNAL MEDICINE

## 2019-06-24 PROCEDURE — 37000008 HC ANESTHESIA 1ST 15 MINUTES: Performed by: INTERNAL MEDICINE

## 2019-06-24 PROCEDURE — 88305 TISSUE EXAM BY PATHOLOGIST: CPT | Performed by: PATHOLOGY

## 2019-06-24 PROCEDURE — 43251 EGD REMOVE LESION SNARE: CPT | Mod: ,,, | Performed by: INTERNAL MEDICINE

## 2019-06-24 PROCEDURE — 43239 EGD BIOPSY SINGLE/MULTIPLE: CPT | Mod: 59,,, | Performed by: INTERNAL MEDICINE

## 2019-06-24 PROCEDURE — 88305 TISSUE EXAM BY PATHOLOGIST: CPT | Mod: 26,,, | Performed by: PATHOLOGY

## 2019-06-24 PROCEDURE — D9220A PRA ANESTHESIA: Mod: CRNA,,, | Performed by: NURSE ANESTHETIST, CERTIFIED REGISTERED

## 2019-06-24 PROCEDURE — 43251 EGD REMOVE LESION SNARE: CPT | Performed by: INTERNAL MEDICINE

## 2019-06-24 PROCEDURE — 43251 PR EGD, FLEX, W/REMOVAL, TUMOR/POLYP/LESION(S), SNARE: ICD-10-PCS | Mod: ,,, | Performed by: INTERNAL MEDICINE

## 2019-06-24 PROCEDURE — 88342 IMHCHEM/IMCYTCHM 1ST ANTB: CPT | Mod: 26,,, | Performed by: PATHOLOGY

## 2019-06-24 PROCEDURE — 27201012 HC FORCEPS, HOT/COLD, DISP: Performed by: INTERNAL MEDICINE

## 2019-06-24 PROCEDURE — 88342 TISSUE SPECIMEN TO PATHOLOGY - SURGERY: ICD-10-PCS | Mod: 26,,, | Performed by: PATHOLOGY

## 2019-06-24 PROCEDURE — 88305 TISSUE SPECIMEN TO PATHOLOGY - SURGERY: ICD-10-PCS | Mod: 26,,, | Performed by: PATHOLOGY

## 2019-06-24 PROCEDURE — 88342 IMHCHEM/IMCYTCHM 1ST ANTB: CPT | Performed by: PATHOLOGY

## 2019-06-24 PROCEDURE — 27201089 HC SNARE, DISP (ANY): Performed by: INTERNAL MEDICINE

## 2019-06-24 PROCEDURE — D9220A PRA ANESTHESIA: ICD-10-PCS | Mod: ANES,,, | Performed by: ANESTHESIOLOGY

## 2019-06-24 PROCEDURE — 43239 PR EGD, FLEX, W/BIOPSY, SGL/MULTI: ICD-10-PCS | Mod: 59,,, | Performed by: INTERNAL MEDICINE

## 2019-06-24 PROCEDURE — D9220A PRA ANESTHESIA: Mod: ANES,,, | Performed by: ANESTHESIOLOGY

## 2019-06-24 PROCEDURE — D9220A PRA ANESTHESIA: ICD-10-PCS | Mod: CRNA,,, | Performed by: NURSE ANESTHETIST, CERTIFIED REGISTERED

## 2019-06-24 RX ORDER — PROPOFOL 10 MG/ML
VIAL (ML) INTRAVENOUS
Status: DISCONTINUED | OUTPATIENT
Start: 2019-06-24 | End: 2019-06-24

## 2019-06-24 RX ORDER — SODIUM CHLORIDE 9 MG/ML
INJECTION, SOLUTION INTRAVENOUS CONTINUOUS
Status: DISCONTINUED | OUTPATIENT
Start: 2019-06-24 | End: 2019-06-24 | Stop reason: HOSPADM

## 2019-06-24 RX ORDER — LIDOCAINE HCL/PF 100 MG/5ML
SYRINGE (ML) INTRAVENOUS
Status: DISCONTINUED | OUTPATIENT
Start: 2019-06-24 | End: 2019-06-24

## 2019-06-24 RX ADMIN — PROPOFOL 50 MG: 10 INJECTION, EMULSION INTRAVENOUS at 10:06

## 2019-06-24 RX ADMIN — PROPOFOL 100 MG: 10 INJECTION, EMULSION INTRAVENOUS at 10:06

## 2019-06-24 RX ADMIN — SODIUM CHLORIDE: 0.9 INJECTION, SOLUTION INTRAVENOUS at 09:06

## 2019-06-24 RX ADMIN — LIDOCAINE HYDROCHLORIDE 100 MG: 20 INJECTION, SOLUTION INTRAVENOUS at 10:06

## 2019-06-24 NOTE — TRANSFER OF CARE
"Anesthesia Transfer of Care Note    Patient: Jeff Bullock    Procedure(s) Performed: Procedure(s) (LRB):  EGD (ESOPHAGOGASTRODUODENOSCOPY) (N/A)    Patient location: PACU    Anesthesia Type: general    Transport from OR: Transported from OR on room air with adequate spontaneous ventilation    Post pain: adequate analgesia    Post assessment: no apparent anesthetic complications and tolerated procedure well    Post vital signs: stable    Level of consciousness: awake and alert    Nausea/Vomiting: no nausea/vomiting    Complications: none    Transfer of care protocol was followed      Last vitals:   Visit Vitals  /80   Pulse 69   Temp 36.8 °C (98.2 °F) (Skin)   Resp 18   Ht 5' 8" (1.727 m)   Wt 90.7 kg (200 lb)   SpO2 97%   BMI 30.41 kg/m²     "

## 2019-06-24 NOTE — [employer] PATIENT INSTRUCTIONS
Discharge Summary/Instructions after an Endoscopic Procedure  Patient Name: Jeff Bullock  Patient MRN: [id number]  Patient YOB: 1970 Monday, June 24, 2019  Pradeep Villalba MD  RESTRICTIONS:  During your procedure today, you received medications for sedation.  These   medications may affect your judgment, balance and coordination.  Therefore,   for 24 hours, you have the following restrictions:   - DO NOT drive a car, operate machinery, make legal/financial decisions,   sign important papers or drink alcohol.    ACTIVITY:  Today: no heavy lifting, straining or running due to procedural   sedation/anesthesia.  The following day: return to full activity including work.  DIET:  Eat and drink normally unless instructed otherwise.     TREATMENT FOR COMMON SIDE EFFECTS:  - Mild abdominal pain, nausea, belching, bloating or excessive gas:  rest,   eat lightly and use a heating pad.  - Sore Throat: treat with throat lozenges and/or gargle with warm salt   water.  - Because air was used during the procedure, expelling large amounts of air   from your rectum or belching is normal.  - If a bowel prep was taken, you may not have a bowel movement for 1-3 days.    This is normal.  SYMPTOMS TO WATCH FOR AND REPORT TO YOUR PHYSICIAN:  1. Abdominal pain or bloating, other than gas cramps.  2. Chest pain.  3. Back pain.  4. Signs of infection such as: chills or fever occurring within 24 hours   after the procedure.  5. Rectal bleeding, which would show as bright red, maroon, or black stools.   (A tablespoon of blood from the rectum is not serious, especially if   hemorrhoids are present.)  6. Vomiting.  7. Weakness or dizziness.  GO DIRECTLY TO THE NEAREST EMERGENCY ROOM IF YOU HAVE ANY OF THE FOLLOWING:      Difficulty breathing              Chills and/or fever over 101 F   Persistent vomiting and/or vomiting blood   Severe abdominal pain   Severe chest pain   Black, tarry stools   Bleeding- more than one  tablespoon   Any other symptom or condition that you feel may need urgent attention  Your doctor recommends these additional instructions:  If any biopsies were taken, your doctors clinic will contact you in 1 to 2   weeks with any results.  - Patient has a contact number available for emergencies.  The signs and   symptoms of potential delayed complications were discussed with the   patient.  Return to normal activities tomorrow.  Written discharge   instructions were provided to the patient.   - Resume previous diet.   - Continue present medications.   - No aspirin, ibuprofen, naproxen, or other non-steroidal anti-inflammatory   drugs.   - Await pathology results.   - Discharge patient to home (ambulatory).   - Follow an antireflux regimen.   - Return to my office after studies are complete.  For questions, problems or results please call your physician - Pradeep Villalba MD at Work:  [phone].  [facility], EMERGENCY ROOM PHONE NUMBER: [phone]  IF A COMPLICATION OR EMERGENCY SITUATION ARISES AND YOU ARE UNABLE TO REACH   YOUR PHYSICIAN - GO DIRECTLY TO THE EMERGENCY ROOM.  Pradeep Villalba MD  6/24/2019 10:39:08 AM  This report has been verified and signed electronically.  [employer]

## 2019-06-24 NOTE — ANESTHESIA PREPROCEDURE EVALUATION
06/24/2019  Jeff Bullock is a 48 y.o., male.    Anesthesia Evaluation    I have reviewed the Patient Summary Reports.    I have reviewed the Nursing Notes.      Review of Systems  Anesthesia Hx:  No problems with previous Anesthesia    Cardiovascular:   Hypertension, well controlled    Hepatic/GI:   GERD, well controlled        Physical Exam  General:  Well nourished    Airway/Jaw/Neck:  Airway Findings: Mallampati: II                Anesthesia Plan  Type of Anesthesia, risks & benefits discussed:  Anesthesia Type:  general  Patient's Preference:   Intra-op Monitoring Plan:   Intra-op Monitoring Plan Comments:   Post Op Pain Control Plan:   Post Op Pain Control Plan Comments:   Induction:   IV  Beta Blocker:  Patient is not currently on a Beta-Blocker (No further documentation required).       Informed Consent: Patient understands risks and agrees with Anesthesia plan.  Questions answered. Anesthesia consent signed with patient.  ASA Score: 2     Day of Surgery Review of History & Physical:    H&P update referred to the surgeon.         Ready For Surgery From Anesthesia Perspective.

## 2019-06-24 NOTE — DISCHARGE INSTRUCTIONS
What Is a Hiatal Hernia?    Hiatal hernia is when the area where the stomach and esophagus meet bulges up through the diaphragm into the chest cavity. In some cases, part of the stomach may bulge above the diaphragm. Stomach acid may move up into the esophagus and cause symptoms. The symptoms are often blamed on gastroesophageal reflux disease (GERD). You may only know about the hernia when it shows up on an X-ray taken for other reasons.   What you may feel  The hiatus is a normal hole in the diaphragm. The esophagus passes through this hole and leads to the stomach. In some cases, part of the stomach may bulge above the diaphragm. This bulge is called a hernia. Stomach acid may move up into the esophagus and cause symptoms.  When you eat, the muscle at the hiatus relaxes to allow food to pass into the stomach. It tightens again to keep food and digestive acids in the stomach.  Many people with hiatal hernias have mild symptoms. You may notice the following GERD symptoms:  · Heartburn or other chest discomfort  · A feeling of chest fullness after a meal  · Frequent burping  · Acid taste in the mouth  · Trouble swallowing  Treating symptoms  If you have been diagnosed with hiatal hernia, these suggestions may help improve symptoms:  · Lose excess weight. Extra weight puts pressure on the stomach and esophagus.  · Dont lie down after eating. Sit up for at least an hour after eating. Lying down after eating can increase symptoms.  · Avoid certain foods and drinks. These include fatty foods, chocolate, coffee, mint, and other foods that cause symptoms for you.  · Dont smoke or drink alcohol. These can worsen symptoms.  · Look at your medicines. Discuss your medicines with your healthcare provider. Many medicines can cause symptoms.  · Consider an antacid medicine. Ask your healthcare provider about over-the-counter and prescription medicines that may help.  · Ask about surgery, if needed. Surgery is a treatment  choice for some people. Your healthcare provider can determine if surgery is an option for you.    Date Last Reviewed: 10/1/2016  © 5223-8545 [employer]. [address] [place] [address], [place] [address]. All rights reserved. This information is not intended as a substitute for professional medical care. Always follow your healthcare professional's instructions.        Gastritis (Adult)    Gastritis is inflammation and irritation of the stomach lining. It can be present for a short time (acute) or be long lasting (chronic). Gastritis is often caused by infection with bacteria called H pylori. More than a third of people in the [place] have this bacteria in their bodies. In many cases, H pylori causes no problems or symptoms. In some people, though, the infection irritates the stomach lining and causes gastritis. Other causes of stomach irritation include drinking alcohol or taking pain-relieving medicines called NSAIDs (such as aspirin or ibuprofen).   Symptoms of gastritis can include:  · Abdominal pain or bloating  · Loss of appetite  · Nausea or vomiting  · Vomiting blood or having black stools  · Feeling more tired than usual  An inflamed and irritated stomach lining is more likely to develop a sore called an ulcer. To help prevent this, gastritis should be treated.  Home care  If needed, medicines may be prescribed. If you have H pylori infection, treating it will likely relieve your symptoms. Other changes can help reduce stomach irritation and help it heal.  · If you have been prescribed medicines for H pylori infection, take them as directed. Take all of the medicine until it is finished or your healthcare provider tells you to stop, even if you feel better.  · Your healthcare provider may recommend avoiding NSAIDs. If you take daily aspirin for your heart or other medical reasons, do not stop without talking to your healthcare provider first.  · Avoid drinking alcohol.  · Stop smoking. Smoking can  irritate the stomach and delay healing. As much as possible, stay away from second hand smoke.  Follow-up care  Follow up with your healthcare provider, or as advised by our staff. Testing may be needed to check for inflammation or an ulcer.  When to seek medical advice  Call your healthcare provider for any of the following:  · Stomach pain that gets worse or moves to the lower right abdomen (appendix area)  · Chest pain that appears or gets worse, or spreads to the back, neck, shoulder, or arm  · Frequent vomiting (cant keep down liquids)  · Blood in the stool or vomit (red or black in color)  · Feeling weak or dizzy  · Fever of 100.4ºF (38ºC) or higher, or as directed by your healthcare provider  Date Last Reviewed: 6/22/2015 © 2000-2017 [employer]. [address] [place] [address], [place] [address]. All rights reserved. This information is not intended as a substitute for professional medical care. Always follow your healthcare professional's instructions.      Discharge Instructions: After Your Surgery/Procedure  Youve just had surgery. During surgery you were given medicine called anesthesia to keep you relaxed and free of pain. After surgery you may have some pain or nausea. This is common. Here are some tips for feeling better and getting well after surgery.     Stay on schedule with your medication.   Going home  Your doctor or nurse will show you how to take care of yourself when you go home. He or she will also answer your questions. Have an adult family member or friend drive you home.      For your safety we recommend these precaution for the first 24 hours after your procedure:  · Do not drive or use heavy equipment.  · Do not make important decisions or sign legal papers.  · Do not drink alcohol.  · Have someone stay with you, if needed. He or she can watch for problems and help keep you safe.  · Your concentration, balance, coordination, and judgement may be impaired for many hours after  "anesthesia.  Use caution when ambulating or standing up.     · You may feel weak and "washed out" after anesthesia and surgery.      Subtle residual effects of general anesthesia or sedation with regional / local anesthesia can last more than 24 hours.  Rest for the remainder of the day or longer if your Doctor/Surgeon has advised you to do so.  Although you may feel normal within the first 24 hours, your reflexes and mental ability may be impaired without you realizing it.  You may feel dizzy, lightheaded or sleepy for 24 hours or longer.      Be sure to go to all follow-up visits with your doctor. And rest after your surgery for as long as your doctor tells you to.  Coping with pain  If you have pain after surgery, pain medicine will help you feel better. Take it as told, before pain becomes severe. Also, ask your doctor or pharmacist about other ways to control pain. This might be with heat, ice, or relaxation. And follow any other instructions your surgeon or nurse gives you.  Tips for taking pain medicine  To get the best relief possible, remember these points:  · Pain medicines can upset your stomach. Taking them with a little food may help.  · Most pain relievers taken by mouth need at least 20 to 30 minutes to start to work.  · Taking medicine on a schedule can help you remember to take it. Try to time your medicine so that you can take it before starting an activity. This might be before you get dressed, go for a walk, or sit down for dinner.  · Constipation is a common side effect of pain medicines. Call your doctor before taking any medicines such as laxatives or stool softeners to help ease constipation. Also ask if you should skip any foods. Drinking lots of fluids and eating foods such as fruits and vegetables that are high in fiber can also help. Remember, do not take laxatives unless your surgeon has prescribed them.  · Drinking alcohol and taking pain medicine can cause dizziness and slow your " breathing. It can even be deadly. Do not drink alcohol while taking pain medicine.  · Pain medicine can make you react more slowly to things. Do not drive or run machinery while taking pain medicine.  Your health care provider may tell you to take acetaminophen to help ease your pain. Ask him or her how much you are supposed to take each day. Acetaminophen or other pain relievers may interact with your prescription medicines or other over-the-counter (OTC) drugs. Some prescription medicines have acetaminophen and other ingredients. Using both prescription and OTC acetaminophen for pain can cause you to overdose. Read the labels on your OTC medicines with care. This will help you to clearly know the list of ingredients, how much to take, and any warnings. It may also help you not take too much acetaminophen. If you have questions or do not understand the information, ask your pharmacist or health care provider to explain it to you before you take the OTC medicine.  Managing nausea  Some people have an upset stomach after surgery. This is often because of anesthesia, pain, or pain medicine, or the stress of surgery. These tips will help you handle nausea and eat healthy foods as you get better. If you were on a special food plan before surgery, ask your doctor if you should follow it while you get better. These tips may help:  · Do not push yourself to eat. Your body will tell you when to eat and how much.  · Start off with clear liquids and soup. They are easier to digest.  · Next try semi-solid foods, such as mashed potatoes, applesauce, and gelatin, as you feel ready.  · Slowly move to solid foods. Dont eat fatty, rich, or spicy foods at first.  · Do not force yourself to have 3 large meals a day. Instead eat smaller amounts more often.  · Take pain medicines with a small amount of solid food, such as crackers or toast, to avoid nausea.     Call your surgeon if  · You still have pain an hour after taking  medicine. The medicine may not be strong enough.  · You feel too sleepy, dizzy, or groggy. The medicine may be too strong.  · You have side effects like nausea, vomiting, or skin changes, such as rash, itching, or hives.       If you have obstructive sleep apnea  You were given anesthesia medicine during surgery to keep you comfortable and free of pain. After surgery, you may have more apnea spells because of this medicine and other medicines you were given. The spells may last longer than usual.   At home:  · Keep using the continuous positive airway pressure (CPAP) device when you sleep. Unless your health care provider tells you not to, use it when you sleep, day or night. CPAP is a common device used to treat obstructive sleep apnea.  · Talk with your provider before taking any pain medicine, muscle relaxants, or sedatives. Your provider will tell you about the possible dangers of taking these medicines.  © 9263-9759 [employer]. [address] [place] [address]. All rights reserved. This information is not intended as a substitute for professional medical care. Always follow your healthcare professional's instructions.  Discharge Instructions: After Your Surgery/Procedure  Youve just had surgery. During surgery you were given medicine called anesthesia to keep you relaxed and free of pain. After surgery you may have some pain or nausea. This is common. Here are some tips for feeling better and getting well after surgery.     Stay on schedule with your medication.   Going home  Your doctor or nurse will show you how to take care of yourself when you go home. He or she will also answer your questions. Have an adult family member or friend drive you home.      For your safety we recommend these precaution for the first 24 hours after your procedure:  · Do not drive or use heavy equipment.  · Do not make important decisions or sign legal papers.  · Do not drink alcohol.  · Have someone stay  "with you, if needed. He or she can watch for problems and help keep you safe.  · Your concentration, balance, coordination, and judgement may be impaired for many hours after anesthesia.  Use caution when ambulating or standing up.     · You may feel weak and "washed out" after anesthesia and surgery.      Subtle residual effects of general anesthesia or sedation with regional / local anesthesia can last more than 24 hours.  Rest for the remainder of the day or longer if your Doctor/Surgeon has advised you to do so.  Although you may feel normal within the first 24 hours, your reflexes and mental ability may be impaired without you realizing it.  You may feel dizzy, lightheaded or sleepy for 24 hours or longer.      Be sure to go to all follow-up visits with your doctor. And rest after your surgery for as long as your doctor tells you to.  Coping with pain  If you have pain after surgery, pain medicine will help you feel better. Take it as told, before pain becomes severe. Also, ask your doctor or pharmacist about other ways to control pain. This might be with heat, ice, or relaxation. And follow any other instructions your surgeon or nurse gives you.  Tips for taking pain medicine  To get the best relief possible, remember these points:  · Pain medicines can upset your stomach. Taking them with a little food may help.  · Most pain relievers taken by mouth need at least 20 to 30 minutes to start to work.  · Taking medicine on a schedule can help you remember to take it. Try to time your medicine so that you can take it before starting an activity. This might be before you get dressed, go for a walk, or sit down for dinner.  · Constipation is a common side effect of pain medicines. Call your doctor before taking any medicines such as laxatives or stool softeners to help ease constipation. Also ask if you should skip any foods. Drinking lots of fluids and eating foods such as fruits and vegetables that are high in " fiber can also help. Remember, do not take laxatives unless your surgeon has prescribed them.  · Drinking alcohol and taking pain medicine can cause dizziness and slow your breathing. It can even be deadly. Do not drink alcohol while taking pain medicine.  · Pain medicine can make you react more slowly to things. Do not drive or run machinery while taking pain medicine.  Your health care provider may tell you to take acetaminophen to help ease your pain. Ask him or her how much you are supposed to take each day. Acetaminophen or other pain relievers may interact with your prescription medicines or other over-the-counter (OTC) drugs. Some prescription medicines have acetaminophen and other ingredients. Using both prescription and OTC acetaminophen for pain can cause you to overdose. Read the labels on your OTC medicines with care. This will help you to clearly know the list of ingredients, how much to take, and any warnings. It may also help you not take too much acetaminophen. If you have questions or do not understand the information, ask your pharmacist or health care provider to explain it to you before you take the OTC medicine.  Managing nausea  Some people have an upset stomach after surgery. This is often because of anesthesia, pain, or pain medicine, or the stress of surgery. These tips will help you handle nausea and eat healthy foods as you get better. If you were on a special food plan before surgery, ask your doctor if you should follow it while you get better. These tips may help:  · Do not push yourself to eat. Your body will tell you when to eat and how much.  · Start off with clear liquids and soup. They are easier to digest.  · Next try semi-solid foods, such as mashed potatoes, applesauce, and gelatin, as you feel ready.  · Slowly move to solid foods. Dont eat fatty, rich, or spicy foods at first.  · Do not force yourself to have 3 large meals a day. Instead eat smaller amounts more often.  · Take  pain medicines with a small amount of solid food, such as crackers or toast, to avoid nausea.     Call your surgeon if  · You still have pain an hour after taking medicine. The medicine may not be strong enough.  · You feel too sleepy, dizzy, or groggy. The medicine may be too strong.  · You have side effects like nausea, vomiting, or skin changes, such as rash, itching, or hives.       If you have obstructive sleep apnea  You were given anesthesia medicine during surgery to keep you comfortable and free of pain. After surgery, you may have more apnea spells because of this medicine and other medicines you were given. The spells may last longer than usual.   At home:  · Keep using the continuous positive airway pressure (CPAP) device when you sleep. Unless your health care provider tells you not to, use it when you sleep, day or night. CPAP is a common device used to treat obstructive sleep apnea.  · Talk with your provider before taking any pain medicine, muscle relaxants, or sedatives. Your provider will tell you about the possible dangers of taking these medicines.  © 3463-3804 [employer]. [address] [place] [address], [place] [address]. All rights reserved. This information is not intended as a substitute for professional medical care. Always follow your healthcare professional's instructions.

## 2019-06-24 NOTE — PLAN OF CARE
Patient awake, alert and oriented. No complaints of any abdominal pain or discomfort. Vital signs within defined limits. Tolerating PO fluids. No distress observed. Tolerated procedure well. Patient ready for transport home.

## 2019-06-24 NOTE — ANESTHESIA POSTPROCEDURE EVALUATION
Anesthesia Post Evaluation    Patient: Jeff Bullock    Procedure(s) Performed: Procedure(s) (LRB):  EGD (ESOPHAGOGASTRODUODENOSCOPY) (N/A)    Final Anesthesia Type: general  Patient location during evaluation: PACU  Patient participation: Yes- Able to Participate  Level of consciousness: awake and alert  Post-procedure vital signs: reviewed and stable  Pain management: adequate  Airway patency: patent  PONV status at discharge: No PONV  Anesthetic complications: no      Cardiovascular status: blood pressure returned to baseline and hemodynamically stable  Respiratory status: unassisted  Hydration status: euvolemic  Follow-up not needed.          Vitals Value Taken Time   /74 6/24/2019 10:44 AM   Temp 36.8 °C (98.2 °F) 6/24/2019  9:32 AM   Pulse 62 6/24/2019 10:44 AM   Resp 17 6/24/2019 10:44 AM   SpO2 94 % 6/24/2019 10:44 AM         No case tracking events are documented in the log.      Pain/Wendy Score: Wendy Score: 9 (6/24/2019 10:44 AM)

## 2019-06-25 VITALS
HEART RATE: 69 BPM | SYSTOLIC BLOOD PRESSURE: 121 MMHG | DIASTOLIC BLOOD PRESSURE: 79 MMHG | RESPIRATION RATE: 16 BRPM | BODY MASS INDEX: 30.31 KG/M2 | WEIGHT: 200 LBS | HEIGHT: 68 IN | TEMPERATURE: 98 F | OXYGEN SATURATION: 96 %

## 2021-05-10 ENCOUNTER — PATIENT MESSAGE (OUTPATIENT)
Dept: RESEARCH | Facility: HOSPITAL | Age: 51
End: 2021-05-10

## 2022-06-06 DIAGNOSIS — H91.90 HEARING DIFFICULTY, UNSPECIFIED LATERALITY: Primary | ICD-10-CM
